# Patient Record
Sex: FEMALE | Race: WHITE | NOT HISPANIC OR LATINO | Employment: FULL TIME | URBAN - METROPOLITAN AREA
[De-identification: names, ages, dates, MRNs, and addresses within clinical notes are randomized per-mention and may not be internally consistent; named-entity substitution may affect disease eponyms.]

---

## 2018-02-25 DIAGNOSIS — Z30.9 ENCOUNTER FOR CONTRACEPTIVE MANAGEMENT, UNSPECIFIED TYPE: Primary | ICD-10-CM

## 2018-03-06 RX ORDER — NORETHINDRONE 0.35 MG/1
TABLET ORAL
Qty: 84 TABLET | Refills: 2 | Status: SHIPPED | OUTPATIENT
Start: 2018-03-06 | End: 2019-12-16 | Stop reason: ALTCHOICE

## 2018-04-30 DIAGNOSIS — T20.212A: Primary | ICD-10-CM

## 2019-12-16 ENCOUNTER — APPOINTMENT (OUTPATIENT)
Dept: RADIOLOGY | Facility: MEDICAL CENTER | Age: 49
End: 2019-12-16
Payer: COMMERCIAL

## 2019-12-16 ENCOUNTER — OFFICE VISIT (OUTPATIENT)
Dept: OBGYN CLINIC | Facility: MEDICAL CENTER | Age: 49
End: 2019-12-16
Payer: COMMERCIAL

## 2019-12-16 VITALS
DIASTOLIC BLOOD PRESSURE: 78 MMHG | BODY MASS INDEX: 40.15 KG/M2 | HEIGHT: 66 IN | WEIGHT: 249.8 LBS | SYSTOLIC BLOOD PRESSURE: 117 MMHG | HEART RATE: 70 BPM

## 2019-12-16 DIAGNOSIS — M17.11 PRIMARY OSTEOARTHRITIS OF RIGHT KNEE: Primary | ICD-10-CM

## 2019-12-16 DIAGNOSIS — M17.11 PATELLOFEMORAL ARTHRITIS OF RIGHT KNEE: ICD-10-CM

## 2019-12-16 DIAGNOSIS — M25.561 RIGHT KNEE PAIN, UNSPECIFIED CHRONICITY: ICD-10-CM

## 2019-12-16 DIAGNOSIS — M70.61 GREATER TROCHANTERIC BURSITIS OF RIGHT HIP: ICD-10-CM

## 2019-12-16 DIAGNOSIS — Z01.89 ENCOUNTER FOR LOWER EXTREMITY COMPARISON IMAGING STUDY: ICD-10-CM

## 2019-12-16 DIAGNOSIS — M25.551 PAIN IN RIGHT HIP: ICD-10-CM

## 2019-12-16 PROCEDURE — 73560 X-RAY EXAM OF KNEE 1 OR 2: CPT

## 2019-12-16 PROCEDURE — 73502 X-RAY EXAM HIP UNI 2-3 VIEWS: CPT

## 2019-12-16 PROCEDURE — 20610 DRAIN/INJ JOINT/BURSA W/O US: CPT | Performed by: ORTHOPAEDIC SURGERY

## 2019-12-16 PROCEDURE — 73564 X-RAY EXAM KNEE 4 OR MORE: CPT

## 2019-12-16 PROCEDURE — 99204 OFFICE O/P NEW MOD 45 MIN: CPT | Performed by: ORTHOPAEDIC SURGERY

## 2019-12-16 RX ORDER — LIDOCAINE HYDROCHLORIDE 10 MG/ML
2 INJECTION, SOLUTION INFILTRATION; PERINEURAL
Status: COMPLETED | OUTPATIENT
Start: 2019-12-16 | End: 2019-12-16

## 2019-12-16 RX ORDER — MELOXICAM 7.5 MG/1
7.5 TABLET ORAL 2 TIMES DAILY PRN
Qty: 60 TABLET | Refills: 1 | Status: SHIPPED | OUTPATIENT
Start: 2019-12-16

## 2019-12-16 RX ORDER — METHYLPREDNISOLONE ACETATE 40 MG/ML
1 INJECTION, SUSPENSION INTRA-ARTICULAR; INTRALESIONAL; INTRAMUSCULAR; SOFT TISSUE
Status: COMPLETED | OUTPATIENT
Start: 2019-12-16 | End: 2019-12-16

## 2019-12-16 RX ADMIN — LIDOCAINE HYDROCHLORIDE 2 ML: 10 INJECTION, SOLUTION INFILTRATION; PERINEURAL at 18:15

## 2019-12-16 RX ADMIN — METHYLPREDNISOLONE ACETATE 1 ML: 40 INJECTION, SUSPENSION INTRA-ARTICULAR; INTRALESIONAL; INTRAMUSCULAR; SOFT TISSUE at 18:15

## 2019-12-16 NOTE — PROGRESS NOTES
Assessment/Plan     1  Primary osteoarthritis of right knee    2  Right knee pain, unspecified chronicity    3  Encounter for lower extremity comparison imaging study    4  Patellofemoral arthritis of right knee    5  Pain in right hip    6  Greater trochanteric bursitis of right hip      Orders Placed This Encounter   Procedures    Large joint arthrocentesis: R greater trochanteric bursa    XR knee 4+ vw right injury    XR knee 1 or 2 vw left    XR hip/pelv 2-3 vws right if performed    Ambulatory referral to Physical Therapy    Ambulatory referral to Physical Therapy    Ambulatory referral to Physical Therapy     · Discussed with patient conservative treatments right knee and hip: CSI, physical therapy, medications , bracing and weight loss  · Physical therapy Right knee and for greater trochanteric bursitis   Prescribed patient Meloxicam 7 5mg prn for pain , medications warnings were reviewed with patient  · Received Right trochanteric bursa  steroid injection today  Patient knows to ice and avoid strenuous activity for 1-2 days if needed  · declined short hinged knee brace   · Will hold off on Right knee CSI   Return if symptoms worsen or fail to improve  I answered all of the patient's questions during the visit and provided education of the patient's condition during the visit  The patient verbalized understanding of the information given and agrees with the plan  This note was dictated using Devario software  It may contain errors including improperly dictated words  Please contact physician directly for any questions  History of Present Illness   Chief complaint:   Chief Complaint   Patient presents with    Right Knee - Pain       HPI: Dionne Elizondo is a 54 y o  female that c/o right knee pain  Patient states she has been having right knee pain for 2-3 weeks denies any falls or trauma   Patient states she is having burning and stabbing pain over anterolateral right knee that comes and goes   Denies any instability but notes occasional popping  Pain is worse with kneeling, pivoting, prolong sitting and with transitional positions  She had note tried icing,elevating or taking any pain medications pain  Patient has tried compression wrapping with no relief  Patient states she is also having right lateral hip pain  Pain is worse when lying on the right side  Patient has no history of having injections, surgeries physical therapy on the right  ROS:    See HPI for musculoskeletal review  All other systems reviewed are negative     Historical Information   History reviewed  No pertinent past medical history  History reviewed  No pertinent surgical history  Social History   Social History     Substance and Sexual Activity   Alcohol Use Not on file     Social History     Substance and Sexual Activity   Drug Use Not on file     Social History     Tobacco Use   Smoking Status Never Smoker   Smokeless Tobacco Never Used     Family History: History reviewed  No pertinent family history  Current Outpatient Medications on File Prior to Visit   Medication Sig Dispense Refill    [DISCONTINUED] JOLIVETTE 0 35 MG tablet TAKE ONE TABLET BY MOUTH DAILY  84 tablet 2    [DISCONTINUED] silver sulfadiazine (SILVADENE,SSD) 1 % cream Apply topically 2 (two) times a day 50 g 0     No current facility-administered medications on file prior to visit  Allergies not on file    Current Outpatient Medications on File Prior to Visit   Medication Sig Dispense Refill    [DISCONTINUED] JOLIVETTE 0 35 MG tablet TAKE ONE TABLET BY MOUTH DAILY  84 tablet 2    [DISCONTINUED] silver sulfadiazine (SILVADENE,SSD) 1 % cream Apply topically 2 (two) times a day 50 g 0     No current facility-administered medications on file prior to visit  Objective   Vitals: Blood pressure 117/78, pulse 70, height 5' 6" (1 676 m), weight 113 kg (249 lb 12 8 oz)  ,Body mass index is 40 32 kg/m²      PE:  AAOx 3  WDWN  Hearing intact, no drainage from eyes  Regular rate  no audible wheezing  no abdominal distension  LE compartments soft, skin intact    rightknee:    Appearance:  no swelling   No ecchymosis  no obvious joint deformity   No effusion  Palpation/Tenderness:  No TTP over medial joint line   +TTP over lateral joint line   No TTP over patella  No TTP over patellar tendon  No TTP over pes anserine bursa  Active Range of Motion:  AROM: full  Special Tests:  Medial Yahaira's Test:  Positive  Lateral Yahaira's Test:  Positive   Apley's compression test:  Negative  Lachman's Test:  negative  Anterior Drawer Test:  Negative  Patellar grind:  Negative  Valgus Stress Test:  negative  Varus Stress Test:  negative     No ipsilateral hip pain with ROM    right hip:   No dislocation/deformity  ROM: full, pain with extreme ER   Neg  Nacho Test  + Impingement test  +TTP over greater trochanter  Abduction: 5/5  Neg   Eros's test  No TTP over SIJ, +TTP just distal to SIJ    rightLE:    Sensation grossly intact L4, L5, S1   Palpable pedal  pulse  AT/GS/EHL intact    Imaging Studies: I have personally reviewed pertinent films in PACS  Right knee:   Mild DJD   R hip:  No acute osseous abnormality    Large joint arthrocentesis: R greater trochanteric bursa  Date/Time: 12/16/2019 6:15 PM  Consent given by: patient  Site marked: site marked  Timeout: Immediately prior to procedure a time out was called to verify the correct patient, procedure, equipment, support staff and site/side marked as required   Supporting Documentation  Indications: pain   Procedure Details  Location: hip - R greater trochanteric bursa  Preparation: Patient was prepped and draped in the usual sterile fashion  Needle size: 22 G  Ultrasound guidance: no  Approach: anterolateral  Medications administered: 2 mL lidocaine 1 %; 1 mL methylPREDNISolone acetate 40 mg/mL    Patient tolerance: patient tolerated the procedure well with no immediate complications  Dressing:  Sterile dressing applied          Scribe Attestation    I,:   Jeanine Armas am acting as a scribe while in the presence of the attending physician :        I,:   Jeff Puri DO personally performed the services described in this documentation    as scribed in my presence :

## 2020-01-13 ENCOUNTER — EVALUATION (OUTPATIENT)
Dept: PHYSICAL THERAPY | Facility: CLINIC | Age: 50
End: 2020-01-13
Payer: COMMERCIAL

## 2020-01-13 DIAGNOSIS — M25.561 RIGHT KNEE PAIN, UNSPECIFIED CHRONICITY: ICD-10-CM

## 2020-01-13 DIAGNOSIS — M70.61 GREATER TROCHANTERIC BURSITIS OF RIGHT HIP: ICD-10-CM

## 2020-01-13 DIAGNOSIS — M25.551 PAIN IN RIGHT HIP: ICD-10-CM

## 2020-01-13 DIAGNOSIS — M17.11 PRIMARY OSTEOARTHRITIS OF RIGHT KNEE: ICD-10-CM

## 2020-01-13 DIAGNOSIS — M17.11 PATELLOFEMORAL ARTHRITIS OF RIGHT KNEE: ICD-10-CM

## 2020-01-13 PROCEDURE — 97112 NEUROMUSCULAR REEDUCATION: CPT | Performed by: PHYSICAL THERAPIST

## 2020-01-13 PROCEDURE — 97161 PT EVAL LOW COMPLEX 20 MIN: CPT | Performed by: PHYSICAL THERAPIST

## 2020-01-13 NOTE — PROGRESS NOTES
PT Evaluation     Today's date: 2020  Patient name: Dionne Elizondo  : 1970  MRN: [id number]  Referring provider: Isai Dumont DO  Dx:   Encounter Diagnosis     ICD-10-CM    1  Right knee pain, unspecified chronicity M25 561 Ambulatory referral to Physical Therapy   2  Primary osteoarthritis of right knee M17 11 Ambulatory referral to Physical Therapy   3  Patellofemoral arthritis of right knee M17 11 Ambulatory referral to Physical Therapy   4  Pain in right hip M25 551 Ambulatory referral to Physical Therapy   5  Greater trochanteric bursitis of right hip M70 61 Ambulatory referral to Physical Therapy                  Assessment  Assessment details: 54year old female patient reports to PT with R hip and knee pain  Patient presents with TTP to R greater trochanter, and had positive scours, indicating some of patient's symptoms could be intra-articular in nature as well  Patient presents with decreased proximal hip strength, decreased R hip flexor flexibility, and decreased R patellar mobility  Patient will benefit from skilled PT services to address current impairments and functional limitations to help patient return to her PLOF  Impairments: abnormal movement, activity intolerance, impaired physical strength and pain with function    Symptom irritability: lowUnderstanding of Dx/Px/POC: good   Prognosis: good    Goals  STG  1  Patient will be independent with completion of HEP throughout therapy  2  Patient will have at worst 5/10 pain so patient can get into her bed with less discomfort in 3 weeks  LTG  1  Patient will increase R proximal hip strength by at least 1/2 grade so patient can navigate stairs with less difficulty in 6 weeks  2  Patient will hike without increased R hip and R knee symptoms in 6 weeks         Plan  Patient would benefit from: skilled physical therapy  Planned therapy interventions: joint mobilization, manual therapy, neuromuscular re-education, patient education, strengthening, stretching, therapeutic activities, therapeutic exercise, home exercise program, functional ROM exercises and flexibility  Frequency: 2x week  Duration in weeks: 6  Treatment plan discussed with: patient        Subjective Evaluation    History of Present Illness  Mechanism of injury: Patient reports with R hip pain of about 1 year and R knee pain of about 3-4 weeks  Patient denies numbness/tingling  Patient has difficulty getting into her bed, which is higher, as she has increased symptoms kneeling on her R knee  Patient also has difficulty STS, navigating stairs, and hiking  Patient had injection in R hip   Pain  Current pain ratin  At best pain ratin  At worst pain ratin  Quality: dull ache  Relieving factors: change in position  Aggravating factors: stair climbing, standing and walking    Treatments  Previous treatment: injection treatment  Current treatment: physical therapy  Patient Goals  Patient goals for therapy: decreased pain, increased strength and return to sport/leisure activities          Objective     Tenderness     Right Hip   Tenderness in the greater trochanter  Active Range of Motion   Left Knee   Normal active range of motion    Right Knee   Normal active range of motion  Flexion: with pain    Passive Range of Motion   Left Hip   Flexion: [facility]  External rotation (90/90): [facility]  Internal rotation (90/90): WFL    Right Hip   Flexion: [facility]  External rotation (90/90): [facility]  Internal rotation (90/90): [facility]    Mobility   Patellar Mobility:     Right Knee   Hypomobile: medial and inferior     Strength/Myotome Testing     Left Hip   Planes of Motion   Extension: 4-  Abduction: 3-    Right Hip   Planes of Motion   Flexion: 3+  Extension: 3+  Abduction: 2+    Tests     Left Hip   Juanito: Negative  Modified Juanito: Negative  90/90 SLR: Positive  Right Hip   Positive scour  Juanito: Positive  Modified Juanito: Positive  90/90 SLR: Negative  Precautions: 2 L knee arthroscopies       Manual  1/13            Patellar mobs R                          Long axis distraction R             Hip flexion MWM R                              Exercise Diary  1/13            Supine piriformis stretch R reviewed            Supine hip flexor stretch reviewed            bridges reviewed            SLR reviewed            luiz reviewed                         Recumbent bike             Leg press             Step ups             STS              LAQ                                                                                                                                      Modalities

## 2020-01-20 ENCOUNTER — OFFICE VISIT (OUTPATIENT)
Dept: PHYSICAL THERAPY | Facility: CLINIC | Age: 50
End: 2020-01-20
Payer: COMMERCIAL

## 2020-01-20 DIAGNOSIS — M25.561 RIGHT KNEE PAIN, UNSPECIFIED CHRONICITY: Primary | ICD-10-CM

## 2020-01-20 DIAGNOSIS — M17.11 PATELLOFEMORAL ARTHRITIS OF RIGHT KNEE: ICD-10-CM

## 2020-01-20 DIAGNOSIS — M70.61 GREATER TROCHANTERIC BURSITIS OF RIGHT HIP: ICD-10-CM

## 2020-01-20 DIAGNOSIS — M17.11 PRIMARY OSTEOARTHRITIS OF RIGHT KNEE: ICD-10-CM

## 2020-01-20 DIAGNOSIS — M25.551 PAIN IN RIGHT HIP: ICD-10-CM

## 2020-01-20 PROCEDURE — 97140 MANUAL THERAPY 1/> REGIONS: CPT | Performed by: PHYSICAL THERAPIST

## 2020-01-20 PROCEDURE — 97112 NEUROMUSCULAR REEDUCATION: CPT | Performed by: PHYSICAL THERAPIST

## 2020-01-20 PROCEDURE — 97110 THERAPEUTIC EXERCISES: CPT | Performed by: PHYSICAL THERAPIST

## 2020-01-20 NOTE — PROGRESS NOTES
Daily Note     Today's date: 2020  Patient name: Melecio Méndez  : 1970  MRN: [id number]  Referring provider: Donella Castleman, DO  Dx:   Encounter Diagnosis     ICD-10-CM    1  Right knee pain, unspecified chronicity M25 561    2  Primary osteoarthritis of right knee M17 11    3  Patellofemoral arthritis of right knee M17 11    4  Pain in right hip M25 551    5  Greater trochanteric bursitis of right hip M70 61                   Subjective: Patient notes hip is much better, doesn't notice much of a different in her knee, especially with kneeling  Objective: See treatment diary below      Assessment: Tolerated treatment well  Patient would benefit from continued PT  Treatment plan initiated this visit with focus on increasing hip and knee mobility and proximal R LE strength and neuromuscular control during functional movements  Patient had no increase in pain post treatment  Plan: Progress treatment as tolerated         Precautions: 2 L knee arthroscopies       Manual             Patellar mobs R  4 min Gr III/IV                        Long axis distraction R  3x30"           Hip flexion MWM R                              Exercise Diary             Supine piriformis stretch R reviewed 3x30"           Supine hip flexor stretch reviewed 3x30" prone           bridges reviewed 2x12            SLR reviewed 2x12            clamshells reviewed 3x10                         Recumbent bike  5 min            Leg press             Step ups  2x8 6"            STS              LAQ  3x10 2 lbs ->                                                                                                                                    Modalities

## 2020-01-27 ENCOUNTER — OFFICE VISIT (OUTPATIENT)
Dept: PHYSICAL THERAPY | Facility: CLINIC | Age: 50
End: 2020-01-27
Payer: COMMERCIAL

## 2020-01-27 DIAGNOSIS — M17.11 PATELLOFEMORAL ARTHRITIS OF RIGHT KNEE: ICD-10-CM

## 2020-01-27 DIAGNOSIS — M17.11 PRIMARY OSTEOARTHRITIS OF RIGHT KNEE: ICD-10-CM

## 2020-01-27 DIAGNOSIS — M25.561 RIGHT KNEE PAIN, UNSPECIFIED CHRONICITY: Primary | ICD-10-CM

## 2020-01-27 DIAGNOSIS — M25.551 PAIN IN RIGHT HIP: ICD-10-CM

## 2020-01-27 DIAGNOSIS — M70.61 GREATER TROCHANTERIC BURSITIS OF RIGHT HIP: ICD-10-CM

## 2020-01-27 PROCEDURE — 97140 MANUAL THERAPY 1/> REGIONS: CPT | Performed by: PHYSICAL THERAPIST

## 2020-01-27 PROCEDURE — 97112 NEUROMUSCULAR REEDUCATION: CPT | Performed by: PHYSICAL THERAPIST

## 2020-01-27 PROCEDURE — 97110 THERAPEUTIC EXERCISES: CPT | Performed by: PHYSICAL THERAPIST

## 2020-01-27 NOTE — PROGRESS NOTES
Daily Note     Today's date: 2020  Patient name: Geeta Kapoor  : 1970  MRN: [id number]  Referring provider: Eren Brambila DO  Dx:   Encounter Diagnosis     ICD-10-CM    1  Right knee pain, unspecified chronicity M25 561    2  Primary osteoarthritis of right knee M17 11    3  Patellofemoral arthritis of right knee M17 11    4  Pain in right hip M25 551    5  Greater trochanteric bursitis of right hip M70 61                   Subjective: Patient notes felt really good until Friday when she was going up the steps and she "tweaked" something and now every time she goes up steps her R hip hurts and gets pain down to her knee  Objective: See treatment diary below      Assessment: Tolerated treatment well  Patient would benefit from continued PT  Patient had flare up navigating stairs at her house, but post manuals targeted at improving R hip mobility, patient had complete resolution of symptoms  Educated to add at home  Functional balance exercise also initiated without increase in symptoms  Patient had no increase in pain post treatment  Plan: Progress treatment as tolerated         Precautions: 2 L knee arthroscopies       Manual            Patellar mobs R  4 min Gr III/IV 4 min gr III/IV                        Long axis distraction R  3x30" 3x30", 2x30" post          Hip flexion MWM R   2x15                           Exercise Diary            Supine piriformis stretch R reviewed 3x30" 3x30"           Supine hip flexor stretch reviewed 3x30" prone 3x30" prone           bridges reviewed 2x12  3x10           SLR reviewed 2x12  3x10           clamshells reviewed 3x10  2x12 ylw                        Recumbent bike  5 min  5 min           Leg press             Step ups  2x8 6"  2x8 6"          STS              LAQ  3x10 2 lbs -> 3x10 3 lbs           SLS w/ hip abd act L LE lift off step   5x5" holds Modalities

## 2020-02-03 ENCOUNTER — OFFICE VISIT (OUTPATIENT)
Dept: PHYSICAL THERAPY | Facility: CLINIC | Age: 50
End: 2020-02-03
Payer: COMMERCIAL

## 2020-02-03 DIAGNOSIS — M25.561 RIGHT KNEE PAIN, UNSPECIFIED CHRONICITY: Primary | ICD-10-CM

## 2020-02-03 DIAGNOSIS — M17.11 PRIMARY OSTEOARTHRITIS OF RIGHT KNEE: ICD-10-CM

## 2020-02-03 DIAGNOSIS — M70.61 GREATER TROCHANTERIC BURSITIS OF RIGHT HIP: ICD-10-CM

## 2020-02-03 DIAGNOSIS — M17.11 PATELLOFEMORAL ARTHRITIS OF RIGHT KNEE: ICD-10-CM

## 2020-02-03 DIAGNOSIS — M25.551 PAIN IN RIGHT HIP: ICD-10-CM

## 2020-02-03 PROCEDURE — 97140 MANUAL THERAPY 1/> REGIONS: CPT | Performed by: PHYSICAL THERAPIST

## 2020-02-03 PROCEDURE — 97110 THERAPEUTIC EXERCISES: CPT | Performed by: PHYSICAL THERAPIST

## 2020-02-03 PROCEDURE — 97112 NEUROMUSCULAR REEDUCATION: CPT | Performed by: PHYSICAL THERAPIST

## 2020-02-03 NOTE — PROGRESS NOTES
Daily Note     Today's date: 2/3/2020  Patient name: Jimena Bains  : 1970  MRN: [id number]  Referring provider: Nieves Thomas DO  Dx:   Encounter Diagnosis     ICD-10-CM    1  Right knee pain, unspecified chronicity M25 561    2  Primary osteoarthritis of right knee M17 11    3  Patellofemoral arthritis of right knee M17 11    4  Pain in right hip M25 551    5  Greater trochanteric bursitis of right hip M70 61                   Subjective: Patient notes knee has been about the same going up and down stairs  Hip only bothers her with unexpected movements  Objective: See treatment diary below      Assessment: Tolerated treatment well  Patient would benefit from continued PT  LAQ added to HEP to help promote knee movement as well as more strengthening  Incorporate KITA into hip strengthening exercises next visit to note if helps with posterior achy hip pain if no progress from this week  Also assess for lumbar component of symptoms next week if no progress  Patient had no increase in pain post treatment  Plan: Progress treatment as tolerated         Precautions: 2 L knee arthroscopies       Manual   2/3         Patellar mobs R  4 min Gr III/IV 4 min gr III/IV  5 min Gr III/IV                      Long axis distraction R  3x30" 3x30", 2x30" post 3x30"         Hip flexion MWM R   2x15 2x15                          Exercise Diary   2/3         Supine piriformis stretch R reviewed 3x30" 3x30"           Supine hip flexor stretch reviewed 3x30" prone 3x30" prone  3x30" prone          bridges reviewed 2x12  3x10  3x10          SLR reviewed 2x12  3x10  3x10          clamshells reviewed 3x10  2x12 ylw  3x10 ylw                      Recumbent bike  5 min  5 min  5 min          Leg press             Step ups  2x8 6"  2x8 6" 2x10 4" step          STS              LAQ  3x10 2 lbs -> 3x10 3 lbs  3x10 4 lbs          SLS w/ hip abd act L LE lift off step   5x5" holds  10x 5" holds Leg press     2x10 65 lbs                                                                                                         Modalities

## 2020-02-10 ENCOUNTER — APPOINTMENT (OUTPATIENT)
Dept: PHYSICAL THERAPY | Facility: CLINIC | Age: 50
End: 2020-02-10
Payer: COMMERCIAL

## 2020-02-20 ENCOUNTER — OFFICE VISIT (OUTPATIENT)
Dept: PHYSICAL THERAPY | Facility: CLINIC | Age: 50
End: 2020-02-20
Payer: COMMERCIAL

## 2020-02-20 DIAGNOSIS — M70.61 GREATER TROCHANTERIC BURSITIS OF RIGHT HIP: ICD-10-CM

## 2020-02-20 DIAGNOSIS — M25.551 PAIN IN RIGHT HIP: ICD-10-CM

## 2020-02-20 DIAGNOSIS — M25.561 RIGHT KNEE PAIN, UNSPECIFIED CHRONICITY: Primary | ICD-10-CM

## 2020-02-20 DIAGNOSIS — M17.11 PATELLOFEMORAL ARTHRITIS OF RIGHT KNEE: ICD-10-CM

## 2020-02-20 DIAGNOSIS — M17.11 PRIMARY OSTEOARTHRITIS OF RIGHT KNEE: ICD-10-CM

## 2020-02-20 PROCEDURE — 97140 MANUAL THERAPY 1/> REGIONS: CPT | Performed by: PHYSICAL THERAPIST

## 2020-02-20 PROCEDURE — 97112 NEUROMUSCULAR REEDUCATION: CPT | Performed by: PHYSICAL THERAPIST

## 2020-02-20 NOTE — PROGRESS NOTES
Daily Note     Today's date: 2020  Patient name: Leander Cuenca  : 1970  MRN: [id number]  Referring provider: Ivan Cheek DO  Dx:   Encounter Diagnosis     ICD-10-CM    1  Right knee pain, unspecified chronicity M25 561    2  Primary osteoarthritis of right knee M17 11    3  Patellofemoral arthritis of right knee M17 11    4  Pain in right hip M25 551    5  Greater trochanteric bursitis of right hip M70 61                   Subjective: Patient notes went on vacation and walking in sand hurt her hip, but other than that every day things is fine, only going up steps bothers her knee  Objective: See treatment diary below      Assessment: Tolerated treatment well  Patient would benefit from continued PT  Cuing to prevent dynamic valgus with tactile cues helped alleviate pain in R knee when completing step ups  Anterior hip mobs added as patient had pain in hip when going up hills  When cued to complete SLR and bridges with abdominal bracing, had less symptoms  Patient had no increase in pain post treatment  Plan: Progress treatment as tolerated         Precautions: 2 L knee arthroscopies       Manual  1/13 1/20 1/27 2/3 2/20        Patellar mobs R  4 min Gr III/IV 4 min gr III/IV  5 min Gr III/IV 5 min Gr III/IV                     Long axis distraction R  3x30" 3x30", 2x30" post 3x30" 3x30"        Hip flexion MWM R   2x15 2x15 3x15        Prone anterior hip mobs     3x30"             Exercise Diary  1/13 1/20 1/27 2/3 2/20        Supine piriformis stretch R reviewed 3x30" 3x30"           Supine hip flexor stretch reviewed 3x30" prone 3x30" prone  3x30" prone  3x30" prone         bridges reviewed 2x12  3x10  3x10  3x10         SLR reviewed 2x12  3x10  3x10  3x10         clamshells reviewed 3x10  2x12 ylw  3x10 ylw 2x12 red                      Recumbent bike  5 min  5 min  5 min  5 min         Leg press             Step ups  2x8 6"  2x8 6" 2x10 4" step  2x10 6" step w/ tactile cue to prevent dyn valg        STS              LAQ  3x10 2 lbs -> 3x10 3 lbs  3x10 4 lbs  3x10 4 lbs         SLS w/ hip abd act L LE lift off step   5x5" holds  10x 5" holds  10x 5" holds         Leg press     2x10 65 lbs  2x12 65 lbs                                                                                                        Modalities

## 2020-02-24 ENCOUNTER — OFFICE VISIT (OUTPATIENT)
Dept: PHYSICAL THERAPY | Facility: CLINIC | Age: 50
End: 2020-02-24
Payer: COMMERCIAL

## 2020-02-24 DIAGNOSIS — M25.561 RIGHT KNEE PAIN, UNSPECIFIED CHRONICITY: Primary | ICD-10-CM

## 2020-02-24 DIAGNOSIS — M70.61 GREATER TROCHANTERIC BURSITIS OF RIGHT HIP: ICD-10-CM

## 2020-02-24 DIAGNOSIS — M25.551 PAIN IN RIGHT HIP: ICD-10-CM

## 2020-02-24 DIAGNOSIS — M17.11 PRIMARY OSTEOARTHRITIS OF RIGHT KNEE: ICD-10-CM

## 2020-02-24 DIAGNOSIS — M17.11 PATELLOFEMORAL ARTHRITIS OF RIGHT KNEE: ICD-10-CM

## 2020-02-24 PROCEDURE — 97110 THERAPEUTIC EXERCISES: CPT | Performed by: PHYSICAL THERAPIST

## 2020-02-24 PROCEDURE — 97112 NEUROMUSCULAR REEDUCATION: CPT | Performed by: PHYSICAL THERAPIST

## 2020-02-24 PROCEDURE — 97140 MANUAL THERAPY 1/> REGIONS: CPT | Performed by: PHYSICAL THERAPIST

## 2020-02-24 NOTE — PROGRESS NOTES
Daily Note     Today's date: 2020  Patient name: Analilia Bartholomew  : 1970  MRN: [id number]  Referring provider: Jayden Rios DO  Dx:   Encounter Diagnosis     ICD-10-CM    1  Right knee pain, unspecified chronicity M25 561    2  Primary osteoarthritis of right knee M17 11    3  Patellofemoral arthritis of right knee M17 11    4  Pain in right hip M25 551    5  Greater trochanteric bursitis of right hip M70 61                   Subjective: Patient notes no issues with her hip, unless sitting for prolonged periods  She still has pain in her knee navigating steps and hills  Objective: See treatment diary below      Assessment: Tolerated treatment well  Patient would benefit from continued PT  Not as much pain with SLR and bridges this visit in R hip post manuals, which had addition of manuals targeting R hip abd region  CKC exercises remained at same intensity due to patient noting soreness post last visit for about a day  Step ups with tactile cue to prevent dynamic valgus didn't provoke any symptoms on knee this visit, which is improvement from last visit  Patient had no increase in pain post treatment  Plan: Progress treatment as tolerated         Precautions: 2 L knee arthroscopies       Manual  1/13 1/20 1/27 2/3 2/20 2/24       Patellar mobs R  4 min Gr III/IV 4 min gr III/IV  5 min Gr III/IV 5 min Gr III/IV 4 min Gr III/IV       R hip abd sidelying TrP Rls      3 min        Long axis distraction R  3x30" 3x30", 2x30" post 3x30" 3x30" 3x30"       Hip flexion MWM R   2x15 2x15 3x15 3x15       Prone anterior hip mobs     3x30"  3x30"           Exercise Diary  1/13 1/20 1/27 2/3 2/20 2/24       Supine piriformis stretch R reviewed 3x30" 3x30"           Supine hip flexor stretch reviewed 3x30" prone 3x30" prone  3x30" prone  3x30" prone  3x30" prone        bridges reviewed 2x12  3x10  3x10  3x10  3x10        SLR reviewed 2x12  3x10  3x10  3x10  3x10       clamshells reviewed 3x10  2x12 ylw  3x10 ylw 2x12 red  3x10 red                     Recumbent bike  5 min  5 min  5 min  5 min  5 min        Leg press             Step ups  2x8 6"  2x8 6" 2x10 4" step  2x10 6" step w/ tactile cue to prevent dyn valg 2x10 6" step w/ tactile cue to prevent dyn valg       STS              LAQ  3x10 2 lbs -> 3x10 3 lbs  3x10 4 lbs  3x10 4 lbs  2x12 5 lbs        SLS w/ hip abd act L LE lift off step   5x5" holds  10x 5" holds  10x 5" holds  10x 5" holds        Leg press     2x10 65 lbs  2x12 65 lbs  2x12 65 lbs                                                                                                       Modalities

## 2020-07-20 ENCOUNTER — TRANSCRIBE ORDERS (OUTPATIENT)
Dept: ADMINISTRATIVE | Facility: HOSPITAL | Age: 50
End: 2020-07-20

## 2020-07-20 ENCOUNTER — OFFICE VISIT (OUTPATIENT)
Dept: OBGYN CLINIC | Facility: MEDICAL CENTER | Age: 50
End: 2020-07-20
Payer: COMMERCIAL

## 2020-07-20 VITALS
HEART RATE: 68 BPM | HEIGHT: 66 IN | BODY MASS INDEX: 41.3 KG/M2 | DIASTOLIC BLOOD PRESSURE: 70 MMHG | SYSTOLIC BLOOD PRESSURE: 106 MMHG | WEIGHT: 257 LBS | TEMPERATURE: 97.9 F

## 2020-07-20 DIAGNOSIS — M25.551 RIGHT HIP PAIN: ICD-10-CM

## 2020-07-20 DIAGNOSIS — M25.551 PAIN IN RIGHT HIP: ICD-10-CM

## 2020-07-20 DIAGNOSIS — M70.61 GREATER TROCHANTERIC BURSITIS OF RIGHT HIP: Primary | ICD-10-CM

## 2020-07-20 DIAGNOSIS — M70.61 TROCHANTERIC BURSITIS OF RIGHT HIP: Primary | ICD-10-CM

## 2020-07-20 PROCEDURE — 99213 OFFICE O/P EST LOW 20 MIN: CPT | Performed by: ORTHOPAEDIC SURGERY

## 2020-07-20 NOTE — PROGRESS NOTES
Assessment/Plan     1  Greater trochanteric bursitis of right hip    2  Pain in right hip      Orders Placed This Encounter   Procedures    MRI arthrogram right hip    FL arthrogram hip right     · Dr Louie Torres has significant physical exam findings for right greater trochanteric bursitis, possible labral tear, and sacroiliitis  · Will be ordering MRI arthrogram right hip to r/o soft tissue pathology   · Continue with home exercises for the right hip  · Will wait for MRI results prior to consider steroid injections  · Will look for alternative location for SIJ injection if appropriate    Return for Discuss MRI arthrogram right hip   I answered all of the patient's questions during the visit and provided education of the patient's condition during the visit  The patient verbalized understanding of the information given and agrees with the plan  This note was dictated using [employer] software  It may contain errors including improperly dictated words  Please contact physician directly for any questions  Subjective   Chief Complaint: No chief complaint on file  HPI:  Guille Obrien is a 54 y o  female who presents for follow up for right greater trochanteric bursitis and right knee mild DJD  Patient had a right greater trochanteric bursitis injection with with relief for 3 months She did go to physical therapy and is doing home exercises she found online   She states her right knee is much better  She is having constant achy pain over right buttock region to lateral hip  Denies any groin pain  Denies any radicular symptoms  Pain is worse with walking on even ground, going up steps and transitional position  She is taking Meloxicam 7 5 mg prn for pain with relief  Review of Systems  See HPI for musculoskeletal review  All other systems reviewed are negative     History:  No past medical history on file  No past surgical history on file    Social History   Social History     Substance and Sexual Activity   Alcohol Use Not on file     Social History     Substance and Sexual Activity   Drug Use Not on file     Social History     Tobacco Use   Smoking Status Never Smoker   Smokeless Tobacco Never Used     Family History: No family history on file  Current Outpatient Medications on File Prior to Visit   Medication Sig Dispense Refill    meloxicam (MOBIC) 7 5 mg tablet Take 1 tablet (7 5 mg total) by mouth 2 (two) times a day as needed for moderate pain 60 tablet 1     No current facility-administered medications on file prior to visit  No Known Allergies     Objective     /70   Pulse 68   Temp 97 9 °F (36 6 °C)   Ht 5' 6" (1 676 m)   Wt 117 kg (257 lb)   BMI 41 48 kg/m²      PE:  AAOx 3  WDWN  Hearing intact, no drainage from eyes  no audible wheezing  no abdominal distension  LE compartments soft, skin intact    right hip:   No dislocation/deformity  Positive [facility]   ROM: FF 0-110 with some low back pain / ER 0-60 no pain/ IR 0-40 with mild pain   Neg  SLR  Neg  Nacho Test  + Impingement test  + TTP over greater trochanter  Abduction: 5/5? No pain with resistant abduction   Neg   Eros's test  + TTP over SIJ    AT/GS intact    Back:    No TTP over lumbar spinous processes, paraspinal musculature  SLR: Neg       Scribe Attestation    I,:   Ana Fair am acting as a scribe while in the presence of the attending physician :        I,:   Tiffanie Villaseñor DO personally performed the services described in this documentation    as scribed in my presence :

## 2020-07-29 ENCOUNTER — HOSPITAL ENCOUNTER (OUTPATIENT)
Dept: MRI IMAGING | Facility: HOSPITAL | Age: 50
Discharge: HOME | End: 2020-07-29
Attending: ORTHOPAEDIC SURGERY
Payer: COMMERCIAL

## 2020-07-29 ENCOUNTER — HOSPITAL ENCOUNTER (OUTPATIENT)
Dept: RADIOLOGY | Facility: HOSPITAL | Age: 50
Discharge: HOME | End: 2020-07-29
Attending: ORTHOPAEDIC SURGERY | Admitting: RADIOLOGY
Payer: COMMERCIAL

## 2020-07-29 DIAGNOSIS — M25.551 PAIN IN RIGHT HIP: ICD-10-CM

## 2020-07-29 DIAGNOSIS — M70.61 GREATER TROCHANTERIC BURSITIS OF RIGHT HIP: ICD-10-CM

## 2020-07-29 DIAGNOSIS — M70.61 TROCHANTERIC BURSITIS OF RIGHT HIP: ICD-10-CM

## 2020-07-29 DIAGNOSIS — M25.551 RIGHT HIP PAIN: ICD-10-CM

## 2020-07-29 PROCEDURE — 77002 NEEDLE LOCALIZATION BY XRAY: CPT

## 2020-07-29 PROCEDURE — A9585 GADOBUTROL INJECTION: HCPCS | Performed by: ORTHOPAEDIC SURGERY

## 2020-07-29 PROCEDURE — 73722 MRI JOINT OF LWR EXTR W/DYE: CPT

## 2020-07-29 PROCEDURE — 27095 INJECTION FOR HIP X-RAY: CPT

## 2020-07-29 RX ORDER — LIDOCAINE HYDROCHLORIDE 10 MG/ML
7 INJECTION, SOLUTION EPIDURAL; INFILTRATION; INTRACAUDAL; PERINEURAL
Status: DISCONTINUED | OUTPATIENT
Start: 2020-07-29 | End: 2020-07-30 | Stop reason: HOSPADM

## 2020-07-29 RX ADMIN — IOHEXOL 100 ML: 300 INJECTION, SOLUTION INTRAVENOUS at 13:51

## 2020-07-29 RX ADMIN — GADOBUTROL 0.2 ML: 604.72 INJECTION INTRAVENOUS at 13:51

## 2020-08-06 ENCOUNTER — OFFICE VISIT (OUTPATIENT)
Dept: OBGYN CLINIC | Facility: MEDICAL CENTER | Age: 50
End: 2020-08-06
Payer: COMMERCIAL

## 2020-08-06 VITALS
BODY MASS INDEX: 40.31 KG/M2 | HEIGHT: 66 IN | SYSTOLIC BLOOD PRESSURE: 115 MMHG | WEIGHT: 250.8 LBS | TEMPERATURE: 98.3 F | HEART RATE: 73 BPM | DIASTOLIC BLOOD PRESSURE: 77 MMHG

## 2020-08-06 DIAGNOSIS — M70.61 GREATER TROCHANTERIC BURSITIS OF RIGHT HIP: Primary | ICD-10-CM

## 2020-08-06 DIAGNOSIS — M53.3 CHRONIC RIGHT SI JOINT PAIN: ICD-10-CM

## 2020-08-06 DIAGNOSIS — G89.29 CHRONIC RIGHT SI JOINT PAIN: ICD-10-CM

## 2020-08-06 DIAGNOSIS — M25.551 PAIN IN RIGHT HIP: ICD-10-CM

## 2020-08-06 PROCEDURE — 99214 OFFICE O/P EST MOD 30 MIN: CPT | Performed by: PHYSICIAN ASSISTANT

## 2020-08-06 PROCEDURE — 20610 DRAIN/INJ JOINT/BURSA W/O US: CPT | Performed by: PHYSICIAN ASSISTANT

## 2020-08-06 RX ORDER — LIDOCAINE HYDROCHLORIDE 10 MG/ML
2 INJECTION, SOLUTION INFILTRATION; PERINEURAL
Status: COMPLETED | OUTPATIENT
Start: 2020-08-06 | End: 2020-08-06

## 2020-08-06 RX ORDER — METHYLPREDNISOLONE ACETATE 40 MG/ML
1 INJECTION, SUSPENSION INTRA-ARTICULAR; INTRALESIONAL; INTRAMUSCULAR; SOFT TISSUE
Status: COMPLETED | OUTPATIENT
Start: 2020-08-06 | End: 2020-08-06

## 2020-08-06 RX ADMIN — METHYLPREDNISOLONE ACETATE 1 ML: 40 INJECTION, SUSPENSION INTRA-ARTICULAR; INTRALESIONAL; INTRAMUSCULAR; SOFT TISSUE at 09:39

## 2020-08-06 RX ADMIN — LIDOCAINE HYDROCHLORIDE 2 ML: 10 INJECTION, SOLUTION INFILTRATION; PERINEURAL at 09:39

## 2020-08-06 NOTE — PROGRESS NOTES
Assessment/Plan     1  Greater trochanteric bursitis of right hip    2  Pain in right hip    3  Chronic right SI joint pain      Orders Placed This Encounter   Procedures    Large joint arthrocentesis: R greater trochanteric bursa    CT SI joint injection    Ambulatory referral to Physical Therapy     · MRI right hip reviewed with patient demonstrating gluteus medius tendinosis  No labral tear  · Patient received right greater troch steroid injection in the office today  Advised to apply ice and avoid strenuous activity for 1-2 days as needed  · Order provided for CT guided SIJ injection to be completed at the hospital  She will consider this if she does not get sufficient relief from the troch bursa injection and has continued tenderness of SIJ  · PT script provided  · Continue meloxicam as needed for pain  Apply ice to hip  · Patient would like to call if her symptoms do not improve  Return if symptoms worsen or fail to improve  I answered all of the patient's questions during the visit and provided education of the patient's condition during the visit  The patient verbalized understanding of the information given and agrees with the plan  This note was dictated using [employer] software  It may contain errors including improperly dictated words  Please contact physician directly for any questions  Subjective   Chief Complaint:   Chief Complaint   Patient presents with    Right Hip - Follow-up       HPI:  Sb Rivera is a 54 y o  female who presents for follow up for right hip pain  Patient received right hip greater troch bursa steroid injection in the office on 12/16/19 and reports 3 months of pain relief  As of today, patient notes intermittent pain on the posterior and lateral aspect of the right hip  Pain is sharp at times  She feels that she has pain down the leg at times but it is rare  Pain is provoked by internal rotation of the hip and doing steps   She takes meloxicam 7 5 mg bid and applies ice with some relief  She had been doing home exercises but felt they were causing increased pain  Denies groin pain, numbness, or tingling  Review of Systems  See HPI for musculoskeletal review  All other systems reviewed are negative     History:  No past medical history on file  Past Surgical History:   Procedure Laterality Date    FL INJECTION RIGHT HIP (ARTHROGRAM)  7/29/2020     Social History   Social History     Substance and Sexual Activity   Alcohol Use None     Social History     Substance and Sexual Activity   Drug Use Not on file     Social History     Tobacco Use   Smoking Status Never Smoker   Smokeless Tobacco Never Used     Family History: No family history on file  Current Outpatient Medications on File Prior to Visit   Medication Sig Dispense Refill    meloxicam (MOBIC) 7 5 mg tablet Take 1 tablet (7 5 mg total) by mouth 2 (two) times a day as needed for moderate pain 60 tablet 1     No current facility-administered medications on file prior to visit  No Known Allergies     Objective     /77   Pulse 73   Temp 98 3 °F (36 8 °C)   Ht 5' 6" (1 676 m)   Wt 114 kg (250 lb 12 8 oz)   BMI 40 48 kg/m²      PE:  AAOx 3  WDWN  Hearing intact, no drainage from eyes  no audible wheezing  no abdominal distension  LE compartments soft, skin intact    right hip:   No dislocation/deformity  Neg  [facility]  ROM: 0- 115  Ext rot- 50  Int rot- 30  Neg  Nacho Test  Pos  Impingement test  +TTP over greater trochanter  Abduction: 5/5  Neg   Eros's test  +TTP over SIJ    AT/GS intact    Imaging Studies: I have personally reviewed pertinent films in PACS  MRI right hip: gluteus medius tendinosis    Large joint arthrocentesis: R greater trochanteric bursa  Date/Time: 8/6/2020 9:39 AM  Consent given by: patient  Site marked: site marked  Timeout: Immediately prior to procedure a time out was called to verify the correct patient, procedure, equipment, support staff and site/side marked as required   Supporting Documentation  Indications: pain   Procedure Details  Location: hip - R greater trochanteric bursa  Preparation: Patient was prepped and draped in the usual sterile fashion  Needle size: 22 G  Ultrasound guidance: no  Approach: lateral  Medications administered: 2 mL lidocaine 1 %; 1 mL methylPREDNISolone acetate 40 mg/mL    Patient tolerance: patient tolerated the procedure well with no immediate complications

## 2020-12-23 ENCOUNTER — IMMUNIZATIONS (OUTPATIENT)
Dept: FAMILY MEDICINE CLINIC | Facility: HOSPITAL | Age: 50
End: 2020-12-23
Payer: COMMERCIAL

## 2020-12-23 DIAGNOSIS — Z23 ENCOUNTER FOR IMMUNIZATION: ICD-10-CM

## 2020-12-23 PROCEDURE — 0001A SARS-COV-2 / COVID-19 MRNA VACCINE (PFIZER-BIONTECH) 30 MCG: CPT

## 2020-12-23 PROCEDURE — 91300 SARS-COV-2 / COVID-19 MRNA VACCINE (PFIZER-BIONTECH) 30 MCG: CPT

## 2021-01-13 ENCOUNTER — IMMUNIZATIONS (OUTPATIENT)
Dept: FAMILY MEDICINE CLINIC | Facility: HOSPITAL | Age: 51
End: 2021-01-13

## 2021-01-13 DIAGNOSIS — Z23 ENCOUNTER FOR IMMUNIZATION: ICD-10-CM

## 2021-01-13 PROCEDURE — 0002A SARS-COV-2 / COVID-19 MRNA VACCINE (PFIZER-BIONTECH) 30 MCG: CPT

## 2021-01-13 PROCEDURE — 91300 SARS-COV-2 / COVID-19 MRNA VACCINE (PFIZER-BIONTECH) 30 MCG: CPT

## 2022-09-14 ENCOUNTER — OFFICE VISIT (OUTPATIENT)
Dept: BARIATRICS | Facility: CLINIC | Age: 52
End: 2022-09-14
Payer: COMMERCIAL

## 2022-09-14 VITALS
HEART RATE: 84 BPM | SYSTOLIC BLOOD PRESSURE: 130 MMHG | HEIGHT: 65 IN | WEIGHT: 257.6 LBS | DIASTOLIC BLOOD PRESSURE: 70 MMHG | OXYGEN SATURATION: 99 % | BODY MASS INDEX: 42.92 KG/M2 | RESPIRATION RATE: 16 BRPM

## 2022-09-14 DIAGNOSIS — E66.01 OBESITY, CLASS III, BMI 40-49.9 (MORBID OBESITY) (HCC): Primary | ICD-10-CM

## 2022-09-14 PROCEDURE — 99202 OFFICE O/P NEW SF 15 MIN: CPT | Performed by: PHYSICIAN ASSISTANT

## 2022-09-14 RX ORDER — MULTIVITAMIN WITH IRON
TABLET ORAL
COMMUNITY

## 2022-09-14 RX ORDER — MELATONIN
1000 DAILY
COMMUNITY

## 2022-09-14 RX ORDER — ALBUTEROL SULFATE 90 UG/1
AEROSOL, METERED RESPIRATORY (INHALATION)
COMMUNITY
Start: 2022-06-08

## 2022-09-14 RX ORDER — CYCLOSPORINE 0.5 MG/ML
EMULSION OPHTHALMIC
COMMUNITY
Start: 2022-09-01

## 2022-09-14 RX ORDER — CHLORAL HYDRATE 500 MG
1000 CAPSULE ORAL DAILY
COMMUNITY

## 2022-09-14 NOTE — PROGRESS NOTES
-Discussed options of HealthyCORE-Intensive Lifestyle Intervention Program, Very Low Calorie Diet-VLCD and Conservative Program , bariatric surgery and the role of weight loss medications  Currently not interested in surgery    -Initial weight loss goal of 5-10% weight loss for improved health  -Screening labs - cbc and cmp utd - will add tsh and lipid   -Patient is interested in pursuing HealthyCORE-Intensive Lifestyle Intervention Program   - patient attributes weight gain primarily to stress eating + insufficient activity + eating late at night  States over the last 2 weeks has been using her treadmill at home   - will start healthycore/ can consider adding AOM moving forward if develops issues with hunger or cravings  Advised starting food log - 4847-3695 calories daily     Assessment/Plan:    Start healthycore     Goals:  Food log (ie ) www myfitnesspal com,sparkpeople  com,loseit com,calorieking  com,etc  baritastic  No sugary beverages  At least 64oz of water daily  Increase physical activity by 10 minutes daily  Gradually increase physical activity to a goal of 5 days per week for 30 minutes of MODERATE intensity PLUS 2 days per week of FULL BODY resistance training  5-10 servings of fruits and vegetables per day and 25-35 grams of dietary fiber per day, gradually increasing  9428-3611 calories per day      Diagnoses and all orders for this visit:    Obesity, Class III, BMI 40-49 9 (morbid obesity) ([facility] [address] )  -     TSH, 3rd generation with Free T4 reflex; Future  -     Lipid panel; Future    Other orders  -     Restasis 0 05 % ophthalmic emulsion  -     albuterol (PROVENTIL HFA,VENTOLIN HFA) 90 mcg/act inhaler  -     cholecalciferol (VITAMIN D3) 1,000 units tablet; Take 1,000 Units by mouth daily  -     Magnesium 250 MG TABS; Take by mouth  -     Omega-3 Fatty Acids (fish oil) 1,000 mg;  Take 1,000 mg by mouth daily        Subjective:   Chief Complaint   Patient presents with    Consult     MWM goal 170/160, waist 44, s/b 1-8 neg      Patient ID: Eliana Vallejo  is a 54 y o  female with excess weight/obesity here to pursue weight management  History reviewed  No pertinent past medical history  HPI:  Obesity/Excess Weight:  Severity: class III  Onset: For most of her life     Modifiers: Physician Supervised Weight Loss Program, diet , intermittent fasting , insufficient physical activity   Contributing factors: Insufficient Physical Activity and Stress/Emotional Eating  Associated symptoms: inability to do certain activities  Colonoscopy-completed cologuard test   Mammogram: utd      Goals: 160-170 lbs   Tobacco: no  Alcohol: no  STOPBAN/8    The following portions of the patient's history were reviewed and updated as appropriate: allergies, current medications, past family history, past medical history, past social history, past surgical history and problem list     Review of Systems   Constitutional: Negative  Respiratory: Negative  Cardiovascular: Negative  Gastrointestinal: Negative  Mild reflux related to food    Musculoskeletal: Positive for arthralgias  Neurological: Negative  Psychiatric/Behavioral: Negative  Objective:    /70 (BP Location: Left arm, Patient Position: Sitting, Cuff Size: Standard)   Pulse 84   Resp 16   Ht 5' 5 25" (1 657 m)   Wt 117 kg (257 lb 9 6 oz)   SpO2 99%   BMI 42 54 kg/m²     Physical Exam  Vitals and nursing note reviewed  Constitutional:       Appearance: Normal appearance  She is obese  HENT:      Head: Normocephalic and atraumatic  Eyes:      Extraocular Movements: Extraocular movements intact  Pupils: Pupils are equal, round, and reactive to light  Cardiovascular:      Rate and Rhythm: Normal rate  Pulmonary:      Effort: Pulmonary effort is normal    Musculoskeletal:         General: Normal range of motion  Cervical back: Normal range of motion  Skin:     General: Skin is warm and dry     Neurological: General: No focal deficit present  Mental Status: She is alert and oriented to person, place, and time     Psychiatric:         Mood and Affect: Mood normal

## 2022-09-14 NOTE — PATIENT INSTRUCTIONS
Start healthycore     Goals:  Food log (ie ) www myfitnesspal com,sparkpeople  com,[employer]spi[employer] com,calorieking  com,etc  baritastic  No sugary beverages  At least 64oz of water daily  Increase physical activity by 10 minutes daily   Gradually increase physical activity to a goal of 5 days per week for 30 minutes of MODERATE intensity PLUS 2 days per week of FULL BODY resistance training  5-10 servings of fruits and vegetables per day and 25-35 grams of dietary fiber per day, gradually increasing  7478-7775 calories per day

## 2022-10-11 NOTE — PROGRESS NOTES
Weight Management Medical Nutrition Assessment  Rafael Khan is here today for Healthy Core initial visit  Patient is employee but is unable to attend Healthy Core classes  RD and patient agreed on 3 additional visits following today's 60 min visit  Visit 1 will consistent of a body composition (30 mins) and visit 2 and 3 will consist of a 30 min RD f/u  Current weight 250 7#  Patient has a busy schedule and son on the spectrum which leaves her with little time to plan and prepare meals  Patient consumes sushi 1 day/week, vegetarian meals 2x/week, but otherwise consumes a traditional diet  Patient is not interested in tracking calories but has been keeping an intermittent written log, which has increased her mindfulness  Patient identifies as a stress eater and may graze on sweets in the evening  Discussed benefits of interval eating, adequate protein intake, and mindful eating  Patient began utilizing in-home treadmill 3-4x/week which she feels has contributed to her 7# loss since MD visit  Low-calorie meal plan reviewed  Patient is interested in medication but will attempt lifestyle modifications as well  Patient will f/u x 2 weeks for body comp and x 1 month for RD visit       Patient seen by Medical Provider in past 6 months:  yes  Requested to schedule appointment with Medical Provider: Yes    Anthropometric Measurements  Start Weight (#): 250 7# (10/12/22)  Current Weight (#): n/a  TBW % Change from start weight: n/a  Ideal Body Weight (#): 126 25# (65 25")  Goal Weight (#): 170#    Weight Loss History  Previous weight loss attempts: Counseling with MD  Self Created Diets (Portion Control, Healthy Food Choices, etc )  Exercise    Food and Nutrition Related History  Wake up: 6 am     Food Recall  Breakfast (7 am): kodiak waffles or pancakes + coffee (3 Tbsp SF creamer)   Snack: --  Lunch: Sushi + Chobani yogurt (on Monday) Or 1 cup black beans + tomatoes + broccoli + feta cheese + Chobani yogurt (2x/week) Or lentil soup Or 1 cup black beans + 1/2 avocado   Snack (6 pm): -- Or cheese stick + coffee (3 Tbsp SF creamer)   Dinner (8 pm): does vegetarian meals 2x/week Or chicken stir-floyd (vegetables + 2 Tbsp brown rice) Or tacos Or grilled chicken + bbq sauce + side salad   Snack: cream cheese cupcake Or SF Jello Or fruit Or hot tea     Beverages: water and 26 ounces iced tea (homemade with stevia)   Volume of beverage intake: 40 ounces of water     Weekends: Worse, larger meals  Cravings: sweets  Trouble area of day: 6 pm     Frequency of Eating out: 2x/month   Food restrictions: does not eat pork for Worship reason   Cooking: self and   Food Shopping: self and      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     Physical Activity  Activity: Treadmill (30 mins)   Frequency:3-4x/week  Physical limitations/barriers to exercise: none but is recovering from a broken arm that was recently out of a cast     Estimated Needs  Energy  Ragland SCREEMO Needs (needs at 257  6#)  BMR: 1,798 kcal  Maintenance calories (sedentary): 2,158 kcal  1-2# loss weekly sedentary: 1,158-1,658 kcal  1-2# loss weekly lightly active: 1,473-1,973 kcal    Protein: 68-86 grams (1 2-1 5g/kg IBW)  Fluid: 67 ounces (35mL/kg IBW)    Nutrition Diagnosis  Yes;     Overweight/obesity related to Excess energy intake as evidenced by BMI more than normative standard for age and sex (obesity-grade III 36+)     Nutrition Intervention    Nutrition Prescription  Calories: 1,400-1,600 kcal  Protein:  g  Fluid: 67+ ounces    Meal Plan (Elvin/Pro)  Breakfast: 300-350/20-25  Snack: 100-150/5+  Lunch: 400/30-35  Snack: 100-150/5+  Dinner: 400/30  Snack: 100-150/5+    Nutrition Education  Calorie controlled menu  Lean protein food choices  Healthy snack options  Food journaling tips    Nutrition Counseling  Strategies: meal planning, portion sizes, healthy snack choices, hydration, fiber intake, protein intake, exercise, food logging    Monitoring and Evaluation:    Evaluation criteria  Energy Intake  Meet protein needs  Maintain adequate hydration  Monitor weekly weight  Meal planning/preparation  Food journal   Decreased portions at mealtimes and snacks  Physical activity     Barriers to learning:emotional  Readiness to change: Preparation:  (Getting ready to change)   Comprehension: good  Expected Compliance: good

## 2022-10-12 ENCOUNTER — OFFICE VISIT (OUTPATIENT)
Dept: BARIATRICS | Facility: CLINIC | Age: 52
End: 2022-10-12

## 2022-10-12 VITALS — BODY MASS INDEX: 41.77 KG/M2 | WEIGHT: 250.7 LBS | HEIGHT: 65 IN

## 2022-10-12 DIAGNOSIS — R63.5 ABNORMAL WEIGHT GAIN: Primary | ICD-10-CM

## 2022-10-12 PROCEDURE — RECHECK

## 2022-10-12 PROCEDURE — WMPFE WEIGHT MANAGEMENT PRO FEE EMPLOYEE

## 2022-10-31 ENCOUNTER — OFFICE VISIT (OUTPATIENT)
Dept: BARIATRICS | Facility: CLINIC | Age: 52
End: 2022-10-31

## 2022-10-31 VITALS — BODY MASS INDEX: 41.62 KG/M2 | HEIGHT: 65 IN | WEIGHT: 249.78 LBS

## 2022-10-31 DIAGNOSIS — R63.5 ABNORMAL WEIGHT GAIN: Primary | ICD-10-CM

## 2022-12-16 ENCOUNTER — TELEPHONE (OUTPATIENT)
Dept: BARIATRICS | Facility: CLINIC | Age: 52
End: 2022-12-16